# Patient Record
Sex: MALE | Race: WHITE | NOT HISPANIC OR LATINO | Employment: OTHER | URBAN - METROPOLITAN AREA
[De-identification: names, ages, dates, MRNs, and addresses within clinical notes are randomized per-mention and may not be internally consistent; named-entity substitution may affect disease eponyms.]

---

## 2017-02-02 ENCOUNTER — COMMUNICATION - HEALTHEAST (OUTPATIENT)
Dept: ADMINISTRATIVE | Facility: CLINIC | Age: 62
End: 2017-02-02

## 2017-02-02 ENCOUNTER — AMBULATORY - HEALTHEAST (OUTPATIENT)
Dept: CARDIOLOGY | Facility: CLINIC | Age: 62
End: 2017-02-02

## 2017-02-02 DIAGNOSIS — I25.10 CAD (CORONARY ARTERY DISEASE): ICD-10-CM

## 2017-02-02 DIAGNOSIS — I10 HTN (HYPERTENSION): ICD-10-CM

## 2017-02-22 ENCOUNTER — RECORDS - HEALTHEAST (OUTPATIENT)
Dept: LAB | Facility: CLINIC | Age: 62
End: 2017-02-22

## 2017-02-23 LAB — HBA1C MFR BLD: 6.3 % (ref 4.2–6.1)

## 2017-04-17 ENCOUNTER — HOSPITAL ENCOUNTER (OUTPATIENT)
Dept: NUCLEAR MEDICINE | Facility: HOSPITAL | Age: 62
Discharge: HOME | End: 2017-04-17
Attending: INTERNAL MEDICINE

## 2017-04-17 ENCOUNTER — HOSPITAL ENCOUNTER (OUTPATIENT)
Dept: CARDIOLOGY | Facility: HOSPITAL | Age: 62
Discharge: HOME | End: 2017-04-17
Attending: INTERNAL MEDICINE

## 2017-04-17 DIAGNOSIS — I25.10 CAD (CORONARY ARTERY DISEASE): ICD-10-CM

## 2017-04-17 DIAGNOSIS — I10 HTN (HYPERTENSION): ICD-10-CM

## 2017-04-17 LAB
CV STRESS CURRENT BP HE: NORMAL
CV STRESS CURRENT HR HE: 58
CV STRESS CURRENT HR HE: 64
CV STRESS CURRENT HR HE: 68
CV STRESS CURRENT HR HE: 70
CV STRESS CURRENT HR HE: 70
CV STRESS CURRENT HR HE: 71
CV STRESS CURRENT HR HE: 71
CV STRESS CURRENT HR HE: 75
CV STRESS CURRENT HR HE: 76
CV STRESS CURRENT HR HE: 76
CV STRESS CURRENT HR HE: 83
CV STRESS CURRENT HR HE: 83
CV STRESS CURRENT HR HE: 84
CV STRESS CURRENT HR HE: 85
CV STRESS CURRENT HR HE: 91
CV STRESS CURRENT HR HE: 92
CV STRESS DEVIATION TIME HE: NORMAL
CV STRESS ECHO PERCENT HR HE: NORMAL
CV STRESS EXERCISE STAGE HE: NORMAL
CV STRESS FINAL RESTING BP HE: NORMAL
CV STRESS FINAL RESTING HR HE: 71
CV STRESS MAX HR HE: 93
CV STRESS MAX TREADMILL GRADE HE: 0
CV STRESS MAX TREADMILL SPEED HE: 0
CV STRESS PEAK DIA BP HE: NORMAL
CV STRESS PEAK SYS BP HE: NORMAL
CV STRESS PHASE HE: NORMAL
CV STRESS PROTOCOL HE: NORMAL
CV STRESS RESTING PT POSITION HE: NORMAL
CV STRESS ST DEVIATION AMOUNT HE: NORMAL
CV STRESS ST DEVIATION ELEVATION HE: NORMAL
CV STRESS ST EVELATION AMOUNT HE: NORMAL
CV STRESS TEST TYPE HE: NORMAL
CV STRESS TOTAL STAGE TIME MIN 1 HE: NORMAL
NUC STRESS EJECTION FRACTION: 55 %
STRESS ECHO BASELINE BP: NORMAL
STRESS ECHO BASELINE HR: 59
STRESS ECHO CALCULATED PERCENT HR: 58 %
STRESS ECHO LAST STRESS BP: NORMAL
STRESS ECHO LAST STRESS HR: 83

## 2017-04-17 ASSESSMENT — MIFFLIN-ST. JEOR: SCORE: 1970.6

## 2017-04-24 ENCOUNTER — OFFICE VISIT - HEALTHEAST (OUTPATIENT)
Dept: CARDIOLOGY | Facility: CLINIC | Age: 62
End: 2017-04-24

## 2017-04-24 DIAGNOSIS — I25.10 CORONARY ARTERY DISEASE INVOLVING NATIVE CORONARY ARTERY OF NATIVE HEART WITHOUT ANGINA PECTORIS: ICD-10-CM

## 2017-04-24 DIAGNOSIS — I10 ESSENTIAL HYPERTENSION: ICD-10-CM

## 2017-04-24 DIAGNOSIS — E78.2 MIXED HYPERLIPIDEMIA: ICD-10-CM

## 2017-04-24 ASSESSMENT — MIFFLIN-ST. JEOR: SCORE: 1984.21

## 2017-09-11 ENCOUNTER — RECORDS - HEALTHEAST (OUTPATIENT)
Dept: LAB | Facility: CLINIC | Age: 62
End: 2017-09-11

## 2017-09-11 LAB
CHOLEST SERPL-MCNC: 139 MG/DL
FASTING STATUS PATIENT QL REPORTED: ABNORMAL
HDLC SERPL-MCNC: 34 MG/DL
LDLC SERPL CALC-MCNC: 69 MG/DL
TRIGL SERPL-MCNC: 181 MG/DL

## 2018-02-06 ENCOUNTER — COMMUNICATION - HEALTHEAST (OUTPATIENT)
Dept: ADMINISTRATIVE | Facility: CLINIC | Age: 63
End: 2018-02-06

## 2020-01-01 ENCOUNTER — TRANSFERRED RECORDS (OUTPATIENT)
Dept: MULTI SPECIALTY CLINIC | Facility: CLINIC | Age: 65
End: 2020-01-01

## 2021-05-29 ENCOUNTER — RECORDS - HEALTHEAST (OUTPATIENT)
Dept: ADMINISTRATIVE | Facility: CLINIC | Age: 66
End: 2021-05-29

## 2021-05-30 ENCOUNTER — RECORDS - HEALTHEAST (OUTPATIENT)
Dept: ADMINISTRATIVE | Facility: CLINIC | Age: 66
End: 2021-05-30

## 2021-05-30 VITALS — BODY MASS INDEX: 34.67 KG/M2 | WEIGHT: 256 LBS | HEIGHT: 72 IN

## 2021-05-30 VITALS — WEIGHT: 253 LBS | HEIGHT: 72 IN | BODY MASS INDEX: 34.27 KG/M2

## 2021-06-02 ENCOUNTER — RECORDS - HEALTHEAST (OUTPATIENT)
Dept: ADMINISTRATIVE | Facility: CLINIC | Age: 66
End: 2021-06-02

## 2021-06-09 ENCOUNTER — RECORDS - HEALTHEAST (OUTPATIENT)
Dept: ADMINISTRATIVE | Facility: CLINIC | Age: 66
End: 2021-06-09

## 2021-06-10 NOTE — PROGRESS NOTES
[facility] Progress Note    Assessment:    1.  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  Essential hypertension controlled  3.  Hyperlipidemia at target LDL cholesterol levels    Plan:    Continue patient's current medical regimen.  Would like to see Sudheer in follow-up in 1 year with no interim cardiac testing ordered    An After Visit Summary was printed and given to the patient.    Subjective:    61-year-old male who was noted to have a moderate left anterior descending artery stenosis by coronary CT angiogram in 2014.  He had a nuclear stress test that showed a small area of inferolateral ischemia at that time.  Over last year he has begun a weight loss program and has lost 30 pounds.  He is exercising without significant dyspnea or anginal symptoms    Patient had a follow-up Lexiscan Cardiolite test on April 17, 2017 that showed no areas of ischemia with ejection fraction of 55%.  Fasting lipid panel from October 2016 revealed an LDL cholesterol 54    Problem List:    There is no problem list on file for this patient.        Current Outpatient Prescriptions   Medication Sig Dispense Refill     aspirin 81 MG EC tablet Take 162 mg by mouth daily.       atorvastatin (LIPITOR) 20 MG tablet Take 20 mg by mouth daily.       JARDIANCE 10 mg Tab Take 10 mg by mouth daily.       lisinopril (PRINIVIL,ZESTRIL) 20 MG tablet Take 20 mg by mouth daily.       loratadine (CLARITIN) 10 mg tablet Take 10 mg by mouth daily.       metFORMIN (GLUCOPHAGE) 500 MG tablet Take 500 mg by mouth 2 (two) times a day.       nitroglycerin (NITROSTAT) 0.4 MG SL tablet as needed.       omeprazole (PRILOSEC) 20 MG capsule Take 20 mg by mouth 2 (two) times a day.       ULORIC 40 mg tablet Take 40 mg by mouth daily.       tadalafil (CIALIS) 20 MG tablet Take 10 mg by mouth daily. 1 hour before needed       traMADol (ULTRAM) 50 mg tablet Take 50 mg by mouth as needed.       No current facility-administered  medications for this visit.        .  Past Surgical History:   Procedure Laterality Date     ABLATION OF DYSRHYTHMIC FOCUS       CARDIAC CATHETERIZATION         .  Social History     Social History     Marital status:      Spouse name: N/A     Number of children: N/A     Years of education: N/A     Occupational History     Not on file.     Social History Main Topics     Smoking status: Former Smoker     Smokeless tobacco: Not on file     Alcohol use Not on file     Drug use: Not on file     Sexual activity: Not on file     Other Topics Concern     Not on file     Social History Narrative       .  Family History   Problem Relation Age of Onset     Heart attack Father      Review of Systems:  General: Weight Loss  Eyes: WNL  Ears/Nose/Throat: WNL  Lungs: WNL  Heart: WNL  Stomach: WNL  Bladder: Frequent Urination at Night  Muscle/Joints: WNL  Skin: WNL  Nervous System: Daytime Sleepiness  Mental Health: WNL     Blood: WNL    Objective:     /66 (Patient Site: Right Arm, Patient Position: Sitting, Cuff Size: Adult Large)  Pulse 70  Resp 18  Ht 6' (1.829 m)  Wt (!) 256 lb (116.1 kg)  SpO2 95%  BMI 34.72 kg/m2  (!) 256 lb (116.1 kg)   [unfilled]    Physical Exam:    GENERAL APPEARANCE: alert, no apparent distress  HEENT: no scleral icterus or xanthelasma  NECK: jugular venous pressure normal  CHEST: symmetric, the lungs were clear to auscultation  CARDIOVASCULAR: regular rhythm without murmur, click, or gallop; no carotid bruits  ABDOMEN: nondistended, nontender, bowel sounds present  EXTREMITIES: no cyanosis, clubbing or edema.    Cardiac Testing:    Cardiolite (Tc-99m Sestamibi) stress test from April 17, 2017 results reviewed as above      Lab Results:    LIPIDS:  Lab Results   Component Value Date    CHOL 111 10/17/2016    CHOL 168 03/14/2016    CHOL 165 08/17/2015     Lab Results   Component Value Date    HDL 26 (L) 10/17/2016    HDL 33 (L) 03/14/2016    HDL 30 (L) 08/17/2015     Lab Results    Component Value Date    LDLCALC 54 10/17/2016    LDLCALC 76 03/14/2016    LDLCALC  08/17/2015      Comment:      Invalid, Triglycerides >300     Lab Results   Component Value Date    TRIG 157 (H) 10/17/2016    TRIG 293 (H) 03/14/2016    TRIG 349 (H) 08/17/2015     No components found for: CHOLHDL    BMP:  Lab Results   Component Value Date    CREATININE 0.87 10/17/2016    BUN 16 10/17/2016     10/17/2016    K 4.6 10/17/2016     10/17/2016    CO2 24 10/17/2016         Chidi Wood MD,F.A.C.C.  [facility] hospital  [phone]

## 2021-07-13 ENCOUNTER — RECORDS - HEALTHEAST (OUTPATIENT)
Dept: ADMINISTRATIVE | Facility: CLINIC | Age: 66
End: 2021-07-13

## 2024-09-13 ENCOUNTER — TRANSFERRED RECORDS (OUTPATIENT)
Dept: HEALTH INFORMATION MANAGEMENT | Facility: CLINIC | Age: 69
End: 2024-09-13

## 2024-09-13 LAB — RETINOPATHY: NEGATIVE

## 2024-10-01 ENCOUNTER — TRANSFERRED RECORDS (OUTPATIENT)
Dept: MULTI SPECIALTY CLINIC | Facility: CLINIC | Age: 69
End: 2024-10-01

## 2024-10-01 LAB — RETINOPATHY: NORMAL

## 2024-11-15 ENCOUNTER — OFFICE VISIT (OUTPATIENT)
Dept: FAMILY MEDICINE | Facility: CLINIC | Age: 69
End: 2024-11-15
Payer: COMMERCIAL

## 2024-11-15 VITALS
RESPIRATION RATE: 18 BRPM | DIASTOLIC BLOOD PRESSURE: 68 MMHG | WEIGHT: 271.1 LBS | HEART RATE: 93 BPM | HEIGHT: 70 IN | BODY MASS INDEX: 38.81 KG/M2 | OXYGEN SATURATION: 97 % | SYSTOLIC BLOOD PRESSURE: 134 MMHG | TEMPERATURE: 97 F

## 2024-11-15 DIAGNOSIS — E11.69 DISORDER OF NERVOUS SYSTEM DUE TO TYPE 2 DIABETES MELLITUS (H): ICD-10-CM

## 2024-11-15 DIAGNOSIS — Z93.6 STATUS POST ILEAL CONDUIT (H): ICD-10-CM

## 2024-11-15 DIAGNOSIS — E66.01 CLASS 2 SEVERE OBESITY DUE TO EXCESS CALORIES WITH SERIOUS COMORBIDITY AND BODY MASS INDEX (BMI) OF 38.0 TO 38.9 IN ADULT (H): ICD-10-CM

## 2024-11-15 DIAGNOSIS — E78.2 MIXED HYPERLIPIDEMIA: ICD-10-CM

## 2024-11-15 DIAGNOSIS — C49.9 LEIOMYOSARCOMA (H): ICD-10-CM

## 2024-11-15 DIAGNOSIS — G98.8 DISORDER OF NERVOUS SYSTEM DUE TO TYPE 2 DIABETES MELLITUS (H): ICD-10-CM

## 2024-11-15 DIAGNOSIS — K21.9 GASTROESOPHAGEAL REFLUX DISEASE WITHOUT ESOPHAGITIS: ICD-10-CM

## 2024-11-15 DIAGNOSIS — J30.2 SEASONAL ALLERGIC RHINITIS, UNSPECIFIED TRIGGER: ICD-10-CM

## 2024-11-15 DIAGNOSIS — E66.812 CLASS 2 SEVERE OBESITY DUE TO EXCESS CALORIES WITH SERIOUS COMORBIDITY AND BODY MASS INDEX (BMI) OF 38.0 TO 38.9 IN ADULT (H): ICD-10-CM

## 2024-11-15 DIAGNOSIS — K75.81 NONALCOHOLIC STEATOHEPATITIS (NASH): ICD-10-CM

## 2024-11-15 DIAGNOSIS — M1A.09X0 CHRONIC GOUT OF MULTIPLE SITES, UNSPECIFIED CAUSE: ICD-10-CM

## 2024-11-15 DIAGNOSIS — I10 PRIMARY HYPERTENSION: ICD-10-CM

## 2024-11-15 DIAGNOSIS — C61 PRIMARY MALIGNANT NEOPLASM OF PROSTATE (H): ICD-10-CM

## 2024-11-15 DIAGNOSIS — I25.10 ARTERIOSCLEROSIS OF CORONARY ARTERY: ICD-10-CM

## 2024-11-15 DIAGNOSIS — C67.9 MALIGNANT NEOPLASM OF URINARY BLADDER, UNSPECIFIED SITE (H): ICD-10-CM

## 2024-11-15 DIAGNOSIS — G47.33 OSA (OBSTRUCTIVE SLEEP APNEA): ICD-10-CM

## 2024-11-15 DIAGNOSIS — E11.8 TYPE 2 DIABETES MELLITUS WITH COMPLICATION, WITHOUT LONG-TERM CURRENT USE OF INSULIN (H): Primary | ICD-10-CM

## 2024-11-15 PROBLEM — Z87.891 PERSONAL HISTORY OF TOBACCO USE, PRESENTING HAZARDS TO HEALTH: Status: ACTIVE | Noted: 2024-02-08

## 2024-11-15 PROBLEM — Z85.831 PERSONAL HISTORY OF MALIGNANT NEOPLASM OF SOFT TISSUE: Status: ACTIVE | Noted: 2024-04-12

## 2024-11-15 PROBLEM — L85.1 PLANTAR POROKERATOSIS, ACQUIRED: Status: ACTIVE | Noted: 2021-05-19

## 2024-11-15 PROBLEM — E78.5 HYPERLIPIDEMIA: Status: ACTIVE | Noted: 2017-12-14

## 2024-11-15 PROBLEM — N40.0 BENIGN PROSTATIC HYPERPLASIA: Status: ACTIVE | Noted: 2023-12-26

## 2024-11-15 PROCEDURE — 99204 OFFICE O/P NEW MOD 45 MIN: CPT | Performed by: FAMILY MEDICINE

## 2024-11-15 RX ORDER — ASPIRIN 81 MG/1
81 TABLET, CHEWABLE ORAL DAILY
Qty: 90 TABLET | Refills: 1 | Status: SHIPPED | OUTPATIENT
Start: 2024-11-15

## 2024-11-15 RX ORDER — QUINIDINE GLUCONATE 324 MG
27 TABLET, EXTENDED RELEASE ORAL
COMMUNITY

## 2024-11-15 RX ORDER — LISINOPRIL 20 MG/1
20 TABLET ORAL DAILY
Qty: 90 TABLET | Refills: 3 | Status: SHIPPED | OUTPATIENT
Start: 2024-11-15

## 2024-11-15 RX ORDER — INSULIN ASPART 100 [IU]/ML
1-5 INJECTION, SOLUTION INTRAVENOUS; SUBCUTANEOUS
COMMUNITY
Start: 2024-02-01

## 2024-11-15 RX ORDER — INSULIN DEGLUDEC 100 U/ML
35 INJECTION, SOLUTION SUBCUTANEOUS EVERY MORNING
Qty: 15 ML | Refills: 3 | Status: SHIPPED | OUTPATIENT
Start: 2024-11-15

## 2024-11-15 RX ORDER — LORATADINE 10 MG/1
10 TABLET ORAL DAILY
Qty: 90 TABLET | Refills: 3 | Status: SHIPPED | OUTPATIENT
Start: 2024-11-15

## 2024-11-15 RX ORDER — INSULIN DEGLUDEC 100 U/ML
35 INJECTION, SOLUTION SUBCUTANEOUS
COMMUNITY
Start: 2024-06-28 | End: 2024-11-15

## 2024-11-15 RX ORDER — ATORVASTATIN CALCIUM 20 MG/1
20 TABLET, FILM COATED ORAL DAILY
Qty: 90 TABLET | Refills: 3 | Status: SHIPPED | OUTPATIENT
Start: 2024-11-15

## 2024-11-15 NOTE — PROGRESS NOTES
Assessment & Plan     (E11.8) Type 2 diabetes mellitus with complication, without long-term current use of insulin (H)  (primary encounter diagnosis)  Comment: Last A1c ( 6/2024 ) was 6.9. will continue current plan of care and check A1c. He prefers to come back for labs in fasting state. All meds refilled   Plan: Lipid panel reflex to direct LDL Non-fasting,         Glucose, insulin degludec (TRESIBA FLEXTOUCH)         100 UNIT/ML pen, Hemoglobin A1c, Comprehensive         metabolic panel (BMP + Alb, Alk Phos, ALT, AST,        Total. Bili, TP), Lipid panel reflex to direct         LDL Fasting, Albumin Random Urine Quantitative         with Creat Ratio            (M1A.09X0) Chronic gout of multiple sites, unspecified cause  Comment: discussed and uric acid level ordered. On uloric. 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 JERRY sent for more info   Plan: aspirin (ASA) 81 MG chewable tablet,         Comprehensive metabolic panel (BMP + Alb, Alk         Phos, ALT, AST, Total. Bili, TP)            (K75.81) Nonalcoholic steatohepatitis (SUGGS)  Comment:  will check liver functions today. He did see GI to establish diagnosis. Working on diet/exercise   Plan:     (E78.2) Mixed hyperlipidemia  Comment: discussed and ordered labs.   Plan: Lipid panel reflex to direct LDL  atorvastatin (LIPITOR) 20 MG tablet,                (I25.10) Arteriosclerosis of coronary artery  Comment: says he was told there is some narrowing but no cardiac procedures   Plan:     (I10) Primary  "hypertension  Comment: on lisinopril   BP Readings from Last 6 Encounters:   11/15/24 134/68       Plan: Lipid panel reflex to direct LDL Non-fasting,         lisinopril (ZESTRIL) 20 MG tablet, aspirin         (ASA) 81 MG chewable tablet, Hemoglobin A1c,         Comprehensive metabolic panel (BMP + Alb, Alk         Phos, ALT, AST, Total. Bili, TP), Lipid panel         reflex to direct LDL Fasting, Albumin Random         Urine Quantitative with Creat Ratio, Uric acid            (C67.9) Malignant neoplasm of urinary bladder, unspecified site (H)  Comment: has stoma now. Will follow with oncology   Plan: Comprehensive metabolic panel (BMP + Alb, Alk         Phos, ALT, AST, Total. 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 Had surgery this past spring   Plan:     (Z93.6) Status post ileal conduit (H)  Comment: doing well. Says he feels comfortable managing the stoma   Plan:       BMI  Estimated body mass index is 38.59 kg/m  as calculated from the following:    Height as of this encounter: 1.785 m (5' 10.28\").    Weight as of this encounter: 123 kg (271 lb 1.6 oz).   Weight management plan: Discussed healthy diet and exercise guidelines    Work on weight loss  Regular exercise    Jackson Tyler is a 69 year old, presenting for the following health issues:  [facility]        11/15/2024     1:19 PM   Additional Questions   Roomed by CHICA Teague   Accompanied by wife- matt     Via the Health Maintenance questionnaire, the patient has reported the following services have been completed -Colonscopy: [facility] " 2020-01-01, this information has been sent to the abstraction team.      History of Present Illness       Diabetes:   He presents for follow up of diabetes.   He is checking home blood glucose with a continuous glucose monitor.   He checks blood glucose before meals and at bedtime.  Blood glucose is sometimes over 200 and never under 70.  When his blood glucose is low, the patient is asymptomatic for confusion, blurred vision, lethargy and reports not feeling dizzy, shaky, or weak.  He is concerned about blood sugar frequently over 200.   He is having numbness in feet, burning in feet and excessive thirst.            Hyperlipidemia:  He presents for follow up of hyperlipidemia.   He is taking medication to lower cholesterol. He is not having myalgia or other side effects to statin medications.    Hypertension: He presents for follow up of hypertension.  He does not check blood pressure  regularly outside of the clinic. Outpatient blood pressures have not been over 140/90. He does not follow a low salt diet.     He eats 2-3 servings of fruits and vegetables daily.He consumes 0 sweetened beverage(s) daily.He exercises with enough effort to increase his heart rate 9 or less minutes per day.  He exercises with enough effort to increase his heart rate 3 or less days per week.   He is taking medications regularly.     Here with his wife  New patient to me     Diabetes - on tresiba and novolog now  Not taking the metformin because of GI side effects   Last A1c ( 6/2024 ) was 6.9     Vandana 3 :  Lab Results   Component Value Date    A1C 6.3 02/22/2017    A1C 7.7 07/11/2016    A1C 7.4 03/27/2014       Surgery for the leiomyosarcoma - 3month scans for 3 years, then every 6 months, then annually   Removed prostate and bladder - has a stoma now - he says it is going really well     On iron orally due to     CAD - WPW ablation   Some calcification  - no stent or bypass     ES - saw GI.     Has gout that is under good control  "per his report      Review of Systems  Constitutional, neuro, ENT, endocrine, pulmonary, cardiac, gastrointestinal, genitourinary, musculoskeletal, integument and psychiatric systems are negative, except as otherwise noted.      Objective    /68   Pulse 93   Temp 97  F (36.1  C) (Tympanic)   Resp 18   Ht 1.785 m (5' 10.28\")   Wt 123 kg (271 lb 1.6 oz)   SpO2 97%   BMI 38.59 kg/m    Body mass index is 38.59 kg/m .  Physical Exam   GENERAL: alert and no distress  EYES: Eyes grossly normal to inspection, PERRL and conjunctivae and sclerae normal  NECK: no adenopathy, no asymmetry, masses, or scars  RESP: lungs clear to auscultation - no rales, rhonchi or wheezes  CV: regular rate and rhythm, normal S1 S2, no S3 or S4, no murmur, click or rub, no peripheral edema  ABDOMEN: soft, nontender, without hepatosplenomegaly or masses, bowel sounds normal, and stoma well healed   MS: no gross musculoskeletal defects noted, no edema  SKIN: no suspicious lesions or rashes  NEURO: Normal strength and tone, mentation intact and speech normal  PSYCH: mentation appears normal, affect normal/bright          Signed Electronically by: Daisha Orlando MD    "

## 2024-11-17 PROBLEM — Z85.831 PERSONAL HISTORY OF MALIGNANT NEOPLASM OF SOFT TISSUE: Status: RESOLVED | Noted: 2024-04-12 | Resolved: 2024-11-17

## 2024-12-04 ENCOUNTER — LAB (OUTPATIENT)
Dept: LAB | Facility: CLINIC | Age: 69
End: 2024-12-04
Payer: COMMERCIAL

## 2024-12-04 DIAGNOSIS — C67.9 MALIGNANT NEOPLASM OF URINARY BLADDER, UNSPECIFIED SITE (H): ICD-10-CM

## 2024-12-04 DIAGNOSIS — M1A.09X0 CHRONIC GOUT OF MULTIPLE SITES, UNSPECIFIED CAUSE: ICD-10-CM

## 2024-12-04 DIAGNOSIS — E11.8 TYPE 2 DIABETES MELLITUS WITH COMPLICATION, WITHOUT LONG-TERM CURRENT USE OF INSULIN (H): ICD-10-CM

## 2024-12-04 DIAGNOSIS — I10 PRIMARY HYPERTENSION: ICD-10-CM

## 2024-12-04 DIAGNOSIS — E78.2 MIXED HYPERLIPIDEMIA: ICD-10-CM

## 2024-12-04 LAB
ALBUMIN SERPL BCG-MCNC: 4.4 G/DL (ref 3.5–5.2)
ALP SERPL-CCNC: 120 U/L (ref 40–150)
ALT SERPL W P-5'-P-CCNC: 27 U/L (ref 0–70)
ANION GAP SERPL CALCULATED.3IONS-SCNC: 11 MMOL/L (ref 7–15)
AST SERPL W P-5'-P-CCNC: 22 U/L (ref 0–45)
BILIRUB SERPL-MCNC: 0.4 MG/DL
BUN SERPL-MCNC: 38.9 MG/DL (ref 8–23)
CALCIUM SERPL-MCNC: 10 MG/DL (ref 8.8–10.4)
CHLORIDE SERPL-SCNC: 99 MMOL/L (ref 98–107)
CHOLEST SERPL-MCNC: 174 MG/DL
CREAT SERPL-MCNC: 1.63 MG/DL (ref 0.67–1.17)
CREAT UR-MCNC: 103.9 MG/DL
EGFRCR SERPLBLD CKD-EPI 2021: 45 ML/MIN/1.73M2
EST. AVERAGE GLUCOSE BLD GHB EST-MCNC: 220 MG/DL
FASTING STATUS PATIENT QL REPORTED: NO
GLUCOSE SERPL-MCNC: 271 MG/DL (ref 70–99)
GLUCOSE SERPL-MCNC: 271 MG/DL (ref 70–99)
HBA1C MFR BLD: 9.3 % (ref 0–5.6)
HCO3 SERPL-SCNC: 21 MMOL/L (ref 22–29)
HDLC SERPL-MCNC: 32 MG/DL
LDLC SERPL CALC-MCNC: 91 MG/DL
MICROALBUMIN UR-MCNC: 657.5 MG/L
MICROALBUMIN/CREAT UR: 632.82 MG/G CR (ref 0–17)
NONHDLC SERPL-MCNC: 142 MG/DL
POTASSIUM SERPL-SCNC: 4.8 MMOL/L (ref 3.4–5.3)
PROT SERPL-MCNC: 8.1 G/DL (ref 6.4–8.3)
SODIUM SERPL-SCNC: 131 MMOL/L (ref 135–145)
TRIGL SERPL-MCNC: 255 MG/DL
URATE SERPL-MCNC: 5.4 MG/DL (ref 3.4–7)

## 2024-12-04 PROCEDURE — 80053 COMPREHEN METABOLIC PANEL: CPT

## 2024-12-04 PROCEDURE — 84550 ASSAY OF BLOOD/URIC ACID: CPT

## 2024-12-04 PROCEDURE — 82570 ASSAY OF URINE CREATININE: CPT

## 2024-12-04 PROCEDURE — 80061 LIPID PANEL: CPT

## 2024-12-04 PROCEDURE — 36415 COLL VENOUS BLD VENIPUNCTURE: CPT

## 2024-12-04 PROCEDURE — 82043 UR ALBUMIN QUANTITATIVE: CPT

## 2024-12-04 PROCEDURE — 83036 HEMOGLOBIN GLYCOSYLATED A1C: CPT

## 2024-12-07 ENCOUNTER — HEALTH MAINTENANCE LETTER (OUTPATIENT)
Age: 69
End: 2024-12-07

## 2024-12-10 ENCOUNTER — OFFICE VISIT (OUTPATIENT)
Dept: FAMILY MEDICINE | Facility: CLINIC | Age: 69
End: 2024-12-10
Payer: COMMERCIAL

## 2024-12-10 VITALS
SYSTOLIC BLOOD PRESSURE: 118 MMHG | OXYGEN SATURATION: 98 % | BODY MASS INDEX: 38.07 KG/M2 | TEMPERATURE: 97.4 F | RESPIRATION RATE: 20 BRPM | DIASTOLIC BLOOD PRESSURE: 64 MMHG | WEIGHT: 267.4 LBS | HEART RATE: 86 BPM

## 2024-12-10 DIAGNOSIS — E11.40 TYPE 2 DIABETES MELLITUS WITH DIABETIC NEUROPATHY, WITH LONG-TERM CURRENT USE OF INSULIN (H): Primary | ICD-10-CM

## 2024-12-10 DIAGNOSIS — N18.31 STAGE 3A CHRONIC KIDNEY DISEASE (H): ICD-10-CM

## 2024-12-10 DIAGNOSIS — E66.812 CLASS 2 SEVERE OBESITY DUE TO EXCESS CALORIES WITH SERIOUS COMORBIDITY AND BODY MASS INDEX (BMI) OF 38.0 TO 38.9 IN ADULT (H): ICD-10-CM

## 2024-12-10 DIAGNOSIS — G47.33 OSA (OBSTRUCTIVE SLEEP APNEA): ICD-10-CM

## 2024-12-10 DIAGNOSIS — Z93.6 STATUS POST ILEAL CONDUIT (H): ICD-10-CM

## 2024-12-10 DIAGNOSIS — E66.01 CLASS 2 SEVERE OBESITY DUE TO EXCESS CALORIES WITH SERIOUS COMORBIDITY AND BODY MASS INDEX (BMI) OF 38.0 TO 38.9 IN ADULT (H): ICD-10-CM

## 2024-12-10 DIAGNOSIS — Z79.4 TYPE 2 DIABETES MELLITUS WITH DIABETIC NEUROPATHY, WITH LONG-TERM CURRENT USE OF INSULIN (H): Primary | ICD-10-CM

## 2024-12-10 DIAGNOSIS — E78.2 MIXED HYPERLIPIDEMIA: ICD-10-CM

## 2024-12-10 DIAGNOSIS — C49.9 LEIOMYOSARCOMA (H): ICD-10-CM

## 2024-12-10 PROBLEM — Z87.891 PERSONAL HISTORY OF TOBACCO USE, PRESENTING HAZARDS TO HEALTH: Status: RESOLVED | Noted: 2024-02-08 | Resolved: 2024-12-10

## 2024-12-10 PROCEDURE — 99214 OFFICE O/P EST MOD 30 MIN: CPT | Performed by: FAMILY MEDICINE

## 2024-12-10 PROCEDURE — G2211 COMPLEX E/M VISIT ADD ON: HCPCS | Performed by: FAMILY MEDICINE

## 2024-12-10 NOTE — PROGRESS NOTES
Assessment & Plan     (E11.40,  Z79.4) Type 2 diabetes mellitus with diabetic neuropathy, with long-term current use of insulin (H)  (primary encounter diagnosis)  Comment: continue novolog with meals. Continue long acting tresiba 35U/day. Discussed meds and decided to start GLP1 for diabetic control and weight management. Discussed risks/benefits/side effects with the patient. Starting with 0.25 dose - send me a message in a month   Plan:     (N18.31) Stage 3a chronic kidney disease (H)  Comment: worsening GFR over last 6 months. New onset proteinuria. Likely related to worsened diabetic control.  Will recheck labs in a few weeks and working to get diabetes better managed.  Will refer to renal if labs worsen. The patient indicates understanding of these issues and agrees with the plan.   Plan: Basic metabolic panel  (Ca, Cl, CO2, Creat,         Gluc, K, Na, BUN), US Renal Limited, Albumin         Random Urine Quantitative with Creat Ratio            (E66.812,  E66.01,  Z68.38) Class 2 severe obesity due to excess calories with serious comorbidity and body mass index (BMI) of 38.0 to 38.9 in adult (H)  Comment: discussed importance of weight loss for diabetic control.  Starting ozempic today   Plan:     (E78.2) Mixed hyperlipidemia  Comment: on statin   Plan:     (Z93.6) Status post ileal conduit (H)  Comment: doing well with the stoma and bag.   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 Will make sure kidney look ok as well.   Plan:       Jackson Tyler is a 69 year old, presenting for the following health issues:  Diabetes and Results        12/10/2024     8:56 AM   Additional Questions   Roomed by CHICA Teague   Accompanied by Wife      Via the Health Maintenance questionnaire, the patient has reported the following services have been completed -Eye Exam: [facility] eye clinic 2024-10-01, this information has been sent to the abstraction team.  Had pt sign JERRY signed  for records.       History of Present Illness       Diabetes:   He presents for follow up of diabetes.   He is checking home blood glucose with a continuous glucose monitor.   He checks blood glucose before meals, after meals, before and after meals and at bedtime.  Blood glucose is sometimes over 200 and never under 70.  When his blood glucose is low, the patient is asymptomatic for confusion, blurred vision, lethargy and reports not feeling dizzy, shaky, or weak.  He is concerned about blood sugar frequently over 200.   He is having numbness in feet, burning in feet and weight gain.  The patient has had a diabetic eye exam in the last 12 months. Eye exam performed on 10/01/2024. Location of last eye exam [facility] eye [facility].       New patient on 11/15/24 to me  My note is reviewed     He returns because the A1c is elevated.     Re; diabetes - is on tresiba and novolog  Had stopped taking the metformin and jardiance : 1/2024   Trulicity 1.5mg weekly - in the past - was stopped at the start of 2024       A1c 6.9 6/2024     Lab Results   Component Value Date    A1C 9.3 12/04/2024    A1C 6.3 02/22/2017    A1C 7.7 07/11/2016    A1C 7.4 03/27/2014     Taking 35 units of tresiba   Novolog 10 with each meal for about a month     Sleep test from [facility] received- done 2019     Follow up on recent labs.    Concern for his urine results--- this was his first urine test since having surgery for stoma.     Current has cold symptoms.     Elevated creatinine - worsening   GFR reviewed in chart -  data so won't pull in   12/4/24 45  9/23/24 55  6/2024 58  4/2024 83    No nsaids  Not well hydrated     CT abd/pelvis 9/2024 :   Decreased bilateral inflammatory ureteral wall thickening and periureteral inflammatory changes. Mild bilateral pyelocaliectasis and left ureterectasis are not significantly changed. Tiny low-attenuation lesion in the lateral periphery of the left mid    kidney is slightly less apparent  and is likely inflammatory in nature.       Review of Systems  Constitutional, HEENT, cardiovascular, pulmonary, gi and gu systems are negative, except as otherwise noted.      Objective    /64   Pulse 86   Temp 97.4  F (36.3  C) (Tympanic)   Resp 20   Wt 121.3 kg (267 lb 6.4 oz)   SpO2 98%   BMI 38.07 kg/m    Body mass index is 38.07 kg/m .  Physical Exam   Pt is alert and oriented in no acute distress.  Affect is appropriate. Good eye contact.          Signed Electronically by: Daisha Orlando MD

## 2024-12-20 ENCOUNTER — LAB (OUTPATIENT)
Dept: LAB | Facility: CLINIC | Age: 69
End: 2024-12-20
Payer: COMMERCIAL

## 2024-12-20 DIAGNOSIS — N18.31 STAGE 3A CHRONIC KIDNEY DISEASE (H): Primary | ICD-10-CM

## 2024-12-30 DIAGNOSIS — E11.8 TYPE 2 DIABETES MELLITUS WITH UNSPECIFIED COMPLICATIONS (H): ICD-10-CM

## 2024-12-30 RX ORDER — SEMAGLUTIDE 0.68 MG/ML
0.5 INJECTION, SOLUTION SUBCUTANEOUS
Qty: 3 ML | Refills: 0 | Status: SHIPPED | OUTPATIENT
Start: 2024-12-30

## 2025-01-21 ENCOUNTER — OFFICE VISIT (OUTPATIENT)
Dept: FAMILY MEDICINE | Facility: CLINIC | Age: 70
End: 2025-01-21
Payer: COMMERCIAL

## 2025-01-21 ENCOUNTER — APPOINTMENT (OUTPATIENT)
Dept: FAMILY MEDICINE | Facility: CLINIC | Age: 70
End: 2025-01-21
Payer: COMMERCIAL

## 2025-01-21 ENCOUNTER — TELEPHONE (OUTPATIENT)
Dept: FAMILY MEDICINE | Facility: CLINIC | Age: 70
End: 2025-01-21

## 2025-01-21 VITALS
HEIGHT: 70 IN | DIASTOLIC BLOOD PRESSURE: 68 MMHG | BODY MASS INDEX: 37.99 KG/M2 | TEMPERATURE: 96.9 F | OXYGEN SATURATION: 94 % | RESPIRATION RATE: 18 BRPM | HEART RATE: 74 BPM | SYSTOLIC BLOOD PRESSURE: 120 MMHG | WEIGHT: 265.4 LBS

## 2025-01-21 DIAGNOSIS — E11.8 TYPE 2 DIABETES MELLITUS WITH COMPLICATION, WITHOUT LONG-TERM CURRENT USE OF INSULIN (H): Primary | ICD-10-CM

## 2025-01-21 DIAGNOSIS — N18.31 STAGE 3A CHRONIC KIDNEY DISEASE (H): ICD-10-CM

## 2025-01-21 DIAGNOSIS — Z93.6 STATUS POST ILEAL CONDUIT (H): ICD-10-CM

## 2025-01-21 DIAGNOSIS — C49.9 LEIOMYOSARCOMA (H): ICD-10-CM

## 2025-01-21 DIAGNOSIS — E66.812 CLASS 2 SEVERE OBESITY DUE TO EXCESS CALORIES WITH SERIOUS COMORBIDITY AND BODY MASS INDEX (BMI) OF 38.0 TO 38.9 IN ADULT (H): ICD-10-CM

## 2025-01-21 DIAGNOSIS — E11.8 TYPE 2 DIABETES MELLITUS WITH COMPLICATION, WITHOUT LONG-TERM CURRENT USE OF INSULIN (H): ICD-10-CM

## 2025-01-21 DIAGNOSIS — E66.01 CLASS 2 SEVERE OBESITY DUE TO EXCESS CALORIES WITH SERIOUS COMORBIDITY AND BODY MASS INDEX (BMI) OF 38.0 TO 38.9 IN ADULT (H): ICD-10-CM

## 2025-01-21 PROBLEM — E11.40 TYPE 2 DIABETES MELLITUS WITH DIABETIC NEUROPATHY, WITH LONG-TERM CURRENT USE OF INSULIN (H): Status: RESOLVED | Noted: 2017-12-14 | Resolved: 2025-01-21

## 2025-01-21 PROBLEM — Z79.4 TYPE 2 DIABETES MELLITUS WITH DIABETIC NEUROPATHY, WITH LONG-TERM CURRENT USE OF INSULIN (H): Status: RESOLVED | Noted: 2017-12-14 | Resolved: 2025-01-21

## 2025-01-21 PROCEDURE — 99214 OFFICE O/P EST MOD 30 MIN: CPT | Performed by: FAMILY MEDICINE

## 2025-01-21 PROCEDURE — 99207 PR FOOT EXAM NO CHARGE: CPT | Performed by: FAMILY MEDICINE

## 2025-01-21 PROCEDURE — G0009 ADMIN PNEUMOCOCCAL VACCINE: HCPCS | Performed by: FAMILY MEDICINE

## 2025-01-21 PROCEDURE — 90677 PCV20 VACCINE IM: CPT | Performed by: FAMILY MEDICINE

## 2025-01-21 PROCEDURE — G2211 COMPLEX E/M VISIT ADD ON: HCPCS | Performed by: FAMILY MEDICINE

## 2025-01-21 RX ORDER — HYDROCHLOROTHIAZIDE 12.5 MG/1
CAPSULE ORAL
Qty: 1 EACH | Refills: 0 | Status: SHIPPED | OUTPATIENT
Start: 2025-01-21

## 2025-01-21 RX ORDER — ACYCLOVIR 800 MG/1
TABLET ORAL
Qty: 1 EACH | Refills: 0 | Status: SHIPPED | OUTPATIENT
Start: 2025-01-21

## 2025-01-21 RX ORDER — ACYCLOVIR 800 MG/1
TABLET ORAL
Qty: 6 EACH | Refills: 1 | Status: SHIPPED | OUTPATIENT
Start: 2025-01-21

## 2025-01-21 RX ORDER — LANCETS
EACH MISCELLANEOUS
Qty: 100 EACH | Refills: 6 | Status: SHIPPED | OUTPATIENT
Start: 2025-01-21

## 2025-01-21 RX ORDER — HYDROCHLOROTHIAZIDE 12.5 MG/1
CAPSULE ORAL
Qty: 2 EACH | Refills: 0 | Status: CANCELLED | OUTPATIENT
Start: 2025-01-21

## 2025-01-21 ASSESSMENT — PAIN SCALES - GENERAL: PAINLEVEL_OUTOF10: NO PAIN (0)

## 2025-01-21 NOTE — TELEPHONE ENCOUNTER
Hello,    Thank you for sending the Measy 3 Plus Sensors. [facility] is saying they will not cover only #1 sensor. Would you be able to send in a new Rx for quantity #2 sensors for 30 day supply?    Thank you,    Jenn Feldman, [facility]  [phone]

## 2025-01-21 NOTE — TELEPHONE ENCOUNTER
Dr. Orlando:    Please see message below, have the script cued up for 2 Vandana 3 plus sensors, please advise.      VAZQUEZ Del Rio

## 2025-01-21 NOTE — TELEPHONE ENCOUNTER
Dr. Orlando:    See message below, [facility] will [facility] to [facility], but question is, the regular sensor [facility], not the Vandana 3 plus, do we need to resend as sensor plus or wait for patient to notify?      VAZQEUZ Del Rio

## 2025-01-21 NOTE — TELEPHONE ENCOUNTER
I think we should see what they have in stock. Our pharmacy was out of the akilah 3 but [facility] might have it in stock.   LEONARD Orlando MD

## 2025-01-21 NOTE — TELEPHONE ENCOUNTER
Patient came to the clinic, he was short with the staff, would not wait to see the RN, he says he needs Vandana sensor 3 plus and not the regular one, also called pharmacy and spoke to Isidro, he says yes is what is needed. Do see that this was already sent today, called pharmacy and they do see the correct sensor, will close this encounter.      VAZQUEZ Del Rio

## 2025-01-21 NOTE — TELEPHONE ENCOUNTER
I think we can disregard this message. Patient now came in and is going to just get it at [facility]. Looks like Dr. Orlando sent an order there as well.

## 2025-01-21 NOTE — PROGRESS NOTES
Assessment & Plan     (E11.8) Type 2 diabetes mellitus with complication, without long-term current use of insulin (H)  (primary encounter diagnosis)  Comment: will switch tresiba 35U to the morning and send me a message in a couple weeks letting me know if he goes low.  Increase the ozempic to 1.0mg. CGM sensors refilled and back up glucose monitor sent   Plan: FOOT EXAM, Continuous Glucose Sensor (FREESTYLE        NAT 3 SENSOR) MISC, blood glucose monitoring         (NO BRAND SPECIFIED) meter device kit, blood         glucose (NO BRAND SPECIFIED) test strip, thin         (NO BRAND SPECIFIED) lancets, Continuous         Glucose Sensor (FREESTYLE NAT 3 SENSOR) MISC,        Semaglutide, 1 MG/DOSE, (OZEMPIC) 4 MG/3ML pen,        Hemoglobin A1c            (N18.31) Stage 3a chronic kidney disease (H)  Comment: stable   Plan:     (Z93.6) Status post ileal conduit (H)  Comment: doing ok.   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   He is checking home blood glucose with a continuous glucose monitor.   He checks blood glucose before and after meals and at bedtime.  Blood glucose is sometimes over 200 and sometimes under 70. He is aware of hypoglycemia symptoms including shakiness and weakness.   He is concerned about other.   He is having numbness in feet, burning in feet and excessive thirst.            He eats 2-3 servings of fruits and vegetables daily.He consumes 0 sweetened beverage(s) daily.He exercises  "with enough effort to increase his heart rate 9 or less minutes per day.  He exercises with enough effort to increase his heart rate 3 or less days per week.   He is taking medications regularly.     Plan at our visit 12/10/24 :   (E11.40,  Z79.4) Type 2 diabetes mellitus with diabetic neuropathy, with long-term current use of insulin (H)  (primary encounter diagnosis)  Comment: continue novolog with meals. Continue long acting tresiba 35U/day. Discussed meds and decided to start GLP1 for diabetic control and weight management. Discussed risks/benefits/side effects with the patient. Starting with 0.25 dose - send me a message in a month     (N18.31) Stage 3a chronic kidney disease (H)  Comment: worsening GFR over last 6 months. New onset proteinuria. Likely related to worsened diabetic control.  Will recheck labs in a few weeks and working to get diabetes better managed.  Will refer to renal if labs worsen. The patient indicates understanding of these issues and agrees with the plan.       Was started on the ozempic last month - has tapered up to the 0.5 mg   Had one bout of vomiting   Minimal nausea     Wt Readings from Last 4 Encounters:   01/21/25 120.4 kg (265 lb 6.4 oz)   12/10/24 121.3 kg (267 lb 6.4 oz)   11/15/24 123 kg (271 lb 1.6 oz)   04/24/17 116.1 kg (256 lb)     Says BG drops quickly  - has had a few episodes into the 50s     Doing novolog 10 with dinner   Tresiba 35 U at night     Lab Results   Component Value Date    A1C 9.3 12/04/2024    A1C 6.3 02/22/2017    A1C 7.7 07/11/2016    A1C 7.4 03/27/2014         Review of Systems  Constitutional, HEENT, cardiovascular, pulmonary, gi and gu systems are negative, except as otherwise noted.      Objective    /68 (BP Location: Right arm, Patient Position: Sitting, Cuff Size: Adult Regular)   Pulse 74   Temp 96.9  F (36.1  C) (Tympanic)   Resp 18   Ht 1.785 m (5' 10.27\")   Wt 120.4 kg (265 lb 6.4 oz)   SpO2 94%   BMI 37.79 kg/m    There is no " height or weight on file to calculate BMI.  Physical Exam   Pt is alert and oriented in no acute distress.  Affect is appropriate. Good eye contact.          Signed Electronically by: Daisha Orlando MD

## 2025-02-02 DIAGNOSIS — E11.8 TYPE 2 DIABETES MELLITUS WITH UNSPECIFIED COMPLICATIONS (H): ICD-10-CM

## 2025-02-03 RX ORDER — SEMAGLUTIDE 0.68 MG/ML
0.5 INJECTION, SOLUTION SUBCUTANEOUS
OUTPATIENT
Start: 2025-02-03

## 2025-02-24 ENCOUNTER — MYC MEDICAL ADVICE (OUTPATIENT)
Dept: FAMILY MEDICINE | Facility: CLINIC | Age: 70
End: 2025-02-24
Payer: COMMERCIAL

## 2025-03-15 ENCOUNTER — HEALTH MAINTENANCE LETTER (OUTPATIENT)
Age: 70
End: 2025-03-15

## 2025-03-25 ENCOUNTER — MYC MEDICAL ADVICE (OUTPATIENT)
Dept: FAMILY MEDICINE | Facility: CLINIC | Age: 70
End: 2025-03-25
Payer: COMMERCIAL

## 2025-03-25 ENCOUNTER — TELEPHONE (OUTPATIENT)
Dept: FAMILY MEDICINE | Facility: CLINIC | Age: 70
End: 2025-03-25
Payer: COMMERCIAL

## 2025-03-25 NOTE — TELEPHONE ENCOUNTER
Would it be beneficial for patient to keep appt for this Friday to follow up on diabetes or should he reschedule for after procedure?    Last A1c 12/4/24= 9.3    Routing to provider, please advise.

## 2025-03-25 NOTE — TELEPHONE ENCOUNTER
Patient Quality Outreach    Patient is due for the following:   Diabetes -  A1C    Action(s) Taken:   Patient has upcoming appointment, these items will be addressed at that time.    Postponing for appt 3/28    Type of outreach:    Chart review performed, no outreach needed.    Questions for provider review:    None         Zahida Judd MA  Chart routed to Care Team.

## 2025-03-27 ENCOUNTER — LAB (OUTPATIENT)
Dept: LAB | Facility: CLINIC | Age: 70
End: 2025-03-27
Payer: COMMERCIAL

## 2025-03-27 DIAGNOSIS — N18.31 STAGE 3A CHRONIC KIDNEY DISEASE (H): ICD-10-CM

## 2025-03-27 DIAGNOSIS — Z79.4 TYPE 2 DIABETES MELLITUS WITH DIABETIC NEUROPATHY, WITH LONG-TERM CURRENT USE OF INSULIN (H): ICD-10-CM

## 2025-03-27 DIAGNOSIS — E11.40 TYPE 2 DIABETES MELLITUS WITH DIABETIC NEUROPATHY, WITH LONG-TERM CURRENT USE OF INSULIN (H): ICD-10-CM

## 2025-03-27 DIAGNOSIS — E11.8 TYPE 2 DIABETES MELLITUS WITH COMPLICATION, WITHOUT LONG-TERM CURRENT USE OF INSULIN (H): ICD-10-CM

## 2025-03-27 LAB
ANION GAP SERPL CALCULATED.3IONS-SCNC: 12 MMOL/L (ref 7–15)
BUN SERPL-MCNC: 21.3 MG/DL (ref 8–23)
CALCIUM SERPL-MCNC: 10 MG/DL (ref 8.8–10.4)
CHLORIDE SERPL-SCNC: 103 MMOL/L (ref 98–107)
CREAT SERPL-MCNC: 1.36 MG/DL (ref 0.67–1.17)
EGFRCR SERPLBLD CKD-EPI 2021: 56 ML/MIN/1.73M2
EST. AVERAGE GLUCOSE BLD GHB EST-MCNC: 120 MG/DL
GLUCOSE SERPL-MCNC: 138 MG/DL (ref 70–99)
HBA1C MFR BLD: 5.8 % (ref 0–5.6)
HCO3 SERPL-SCNC: 23 MMOL/L (ref 22–29)
POTASSIUM SERPL-SCNC: 4.4 MMOL/L (ref 3.4–5.3)
SODIUM SERPL-SCNC: 138 MMOL/L (ref 135–145)

## 2025-03-28 PROBLEM — G98.8 DISORDER OF NERVOUS SYSTEM DUE TO TYPE 2 DIABETES MELLITUS (H): Status: ACTIVE | Noted: 2025-03-28

## 2025-03-28 PROBLEM — E11.69 DISORDER OF NERVOUS SYSTEM DUE TO TYPE 2 DIABETES MELLITUS (H): Status: ACTIVE | Noted: 2025-03-28

## 2025-04-04 ENCOUNTER — APPOINTMENT (OUTPATIENT)
Dept: GENERAL RADIOLOGY | Facility: CLINIC | Age: 70
End: 2025-04-04
Attending: EMERGENCY MEDICINE
Payer: COMMERCIAL

## 2025-04-04 ENCOUNTER — HOSPITAL ENCOUNTER (EMERGENCY)
Facility: CLINIC | Age: 70
Discharge: HOME | End: 2025-04-04
Attending: EMERGENCY MEDICINE | Admitting: EMERGENCY MEDICINE
Payer: COMMERCIAL

## 2025-04-04 VITALS
OXYGEN SATURATION: 100 % | WEIGHT: 260 LBS | SYSTOLIC BLOOD PRESSURE: 121 MMHG | HEART RATE: 86 BPM | BODY MASS INDEX: 36.4 KG/M2 | RESPIRATION RATE: 16 BRPM | HEIGHT: 71 IN | DIASTOLIC BLOOD PRESSURE: 69 MMHG | TEMPERATURE: 98.8 F

## 2025-04-04 DIAGNOSIS — Z93.6 NEPHROSTOMY PRESENT (H): ICD-10-CM

## 2025-04-04 DIAGNOSIS — N12 PYELONEPHRITIS: ICD-10-CM

## 2025-04-04 DIAGNOSIS — R50.82 FEVER POSTOP: ICD-10-CM

## 2025-04-04 LAB
ALBUMIN SERPL BCG-MCNC: 3.9 G/DL (ref 3.5–5.2)
ALBUMIN UR-MCNC: 100 MG/DL
ALBUMIN UR-MCNC: NEGATIVE MG/DL
ALP SERPL-CCNC: 88 U/L (ref 40–150)
ALT SERPL W P-5'-P-CCNC: 24 U/L (ref 0–70)
ANION GAP SERPL CALCULATED.3IONS-SCNC: 13 MMOL/L (ref 7–15)
APPEARANCE UR: ABNORMAL
APPEARANCE UR: CLEAR
AST SERPL W P-5'-P-CCNC: 16 U/L (ref 0–45)
BASOPHILS # BLD AUTO: 0.1 10E3/UL (ref 0–0.2)
BASOPHILS NFR BLD AUTO: 1 %
BILIRUB SERPL-MCNC: 0.4 MG/DL
BILIRUB UR QL STRIP: NEGATIVE
BILIRUB UR QL STRIP: NEGATIVE
BUN SERPL-MCNC: 18.2 MG/DL (ref 8–23)
CALCIUM SERPL-MCNC: 9.4 MG/DL (ref 8.8–10.4)
CHLORIDE SERPL-SCNC: 99 MMOL/L (ref 98–107)
COLOR UR AUTO: ABNORMAL
COLOR UR AUTO: ABNORMAL
CREAT SERPL-MCNC: 1.23 MG/DL (ref 0.67–1.17)
EGFRCR SERPLBLD CKD-EPI 2021: 64 ML/MIN/1.73M2
EOSINOPHIL # BLD AUTO: 0.1 10E3/UL (ref 0–0.7)
EOSINOPHIL NFR BLD AUTO: 1 %
ERYTHROCYTE [DISTWIDTH] IN BLOOD BY AUTOMATED COUNT: 13 % (ref 10–15)
FLUAV RNA SPEC QL NAA+PROBE: NEGATIVE
FLUBV RNA RESP QL NAA+PROBE: NEGATIVE
GLUCOSE SERPL-MCNC: 154 MG/DL (ref 70–99)
GLUCOSE UR STRIP-MCNC: NEGATIVE MG/DL
GLUCOSE UR STRIP-MCNC: NEGATIVE MG/DL
HCO3 SERPL-SCNC: 22 MMOL/L (ref 22–29)
HCT VFR BLD AUTO: 38.2 % (ref 40–53)
HGB BLD-MCNC: 13.3 G/DL (ref 13.3–17.7)
HGB UR QL STRIP: ABNORMAL
HGB UR QL STRIP: ABNORMAL
HOLD SPECIMEN: NORMAL
IMM GRANULOCYTES # BLD: 0.1 10E3/UL
IMM GRANULOCYTES NFR BLD: 1 %
KETONES UR STRIP-MCNC: NEGATIVE MG/DL
KETONES UR STRIP-MCNC: NEGATIVE MG/DL
LEUKOCYTE ESTERASE UR QL STRIP: ABNORMAL
LEUKOCYTE ESTERASE UR QL STRIP: ABNORMAL
LYMPHOCYTES # BLD AUTO: 0.6 10E3/UL (ref 0.8–5.3)
LYMPHOCYTES NFR BLD AUTO: 4 %
MCH RBC QN AUTO: 29.9 PG (ref 26.5–33)
MCHC RBC AUTO-ENTMCNC: 34.8 G/DL (ref 31.5–36.5)
MCV RBC AUTO: 86 FL (ref 78–100)
MONOCYTES # BLD AUTO: 0.7 10E3/UL (ref 0–1.3)
MONOCYTES NFR BLD AUTO: 4 %
MUCOUS THREADS #/AREA URNS LPF: PRESENT /LPF
MUCOUS THREADS #/AREA URNS LPF: PRESENT /LPF
NEUTROPHILS # BLD AUTO: 15.1 10E3/UL (ref 1.6–8.3)
NEUTROPHILS NFR BLD AUTO: 90 %
NITRATE UR QL: NEGATIVE
NITRATE UR QL: POSITIVE
NRBC # BLD AUTO: 0 10E3/UL
NRBC BLD AUTO-RTO: 0 /100
PH UR STRIP: 5.5 [PH] (ref 5–7)
PH UR STRIP: 7.5 [PH] (ref 5–7)
PLATELET # BLD AUTO: 233 10E3/UL (ref 150–450)
POTASSIUM SERPL-SCNC: 4.2 MMOL/L (ref 3.4–5.3)
PROT SERPL-MCNC: 7.4 G/DL (ref 6.4–8.3)
RBC # BLD AUTO: 4.45 10E6/UL (ref 4.4–5.9)
RBC URINE: 138 /HPF
RBC URINE: 5 /HPF
RENAL TUB EPI: 2 /HPF
RSV RNA SPEC NAA+PROBE: NEGATIVE
SARS-COV-2 RNA RESP QL NAA+PROBE: NEGATIVE
SODIUM SERPL-SCNC: 134 MMOL/L (ref 135–145)
SP GR UR STRIP: 1.01 (ref 1–1.03)
SP GR UR STRIP: 1.01 (ref 1–1.03)
SQUAMOUS EPITHELIAL: <1 /HPF
UROBILINOGEN UR STRIP-MCNC: NORMAL MG/DL
UROBILINOGEN UR STRIP-MCNC: NORMAL MG/DL
WBC # BLD AUTO: 16.7 10E3/UL (ref 4–11)
WBC CLUMPS #/AREA URNS HPF: PRESENT /HPF
WBC URINE: 67 /HPF
WBC URINE: >182 /HPF

## 2025-04-04 PROCEDURE — 99284 EMERGENCY DEPT VISIT MOD MDM: CPT | Mod: 25 | Performed by: EMERGENCY MEDICINE

## 2025-04-04 PROCEDURE — 87040 BLOOD CULTURE FOR BACTERIA: CPT | Performed by: EMERGENCY MEDICINE

## 2025-04-04 PROCEDURE — 85025 COMPLETE CBC W/AUTO DIFF WBC: CPT | Performed by: EMERGENCY MEDICINE

## 2025-04-04 PROCEDURE — 99284 EMERGENCY DEPT VISIT MOD MDM: CPT | Performed by: EMERGENCY MEDICINE

## 2025-04-04 PROCEDURE — 81003 URINALYSIS AUTO W/O SCOPE: CPT | Performed by: EMERGENCY MEDICINE

## 2025-04-04 PROCEDURE — 87637 SARSCOV2&INF A&B&RSV AMP PRB: CPT | Performed by: EMERGENCY MEDICINE

## 2025-04-04 PROCEDURE — 87186 SC STD MICRODIL/AGAR DIL: CPT | Performed by: EMERGENCY MEDICINE

## 2025-04-04 PROCEDURE — 87088 URINE BACTERIA CULTURE: CPT | Performed by: EMERGENCY MEDICINE

## 2025-04-04 PROCEDURE — 84155 ASSAY OF PROTEIN SERUM: CPT | Performed by: EMERGENCY MEDICINE

## 2025-04-04 PROCEDURE — 36415 COLL VENOUS BLD VENIPUNCTURE: CPT | Performed by: EMERGENCY MEDICINE

## 2025-04-04 PROCEDURE — 82435 ASSAY OF BLOOD CHLORIDE: CPT | Performed by: EMERGENCY MEDICINE

## 2025-04-04 PROCEDURE — 84075 ASSAY ALKALINE PHOSPHATASE: CPT | Performed by: EMERGENCY MEDICINE

## 2025-04-04 PROCEDURE — 250N000013 HC RX MED GY IP 250 OP 250 PS 637: Performed by: EMERGENCY MEDICINE

## 2025-04-04 PROCEDURE — 71046 X-RAY EXAM CHEST 2 VIEWS: CPT

## 2025-04-04 RX ORDER — SULFAMETHOXAZOLE AND TRIMETHOPRIM 800; 160 MG/1; MG/1
1 TABLET ORAL ONCE
Status: COMPLETED | OUTPATIENT
Start: 2025-04-04 | End: 2025-04-04

## 2025-04-04 RX ORDER — SULFAMETHOXAZOLE AND TRIMETHOPRIM 800; 160 MG/1; MG/1
1 TABLET ORAL 2 TIMES DAILY
Qty: 20 TABLET | Refills: 0 | Status: SHIPPED | OUTPATIENT
Start: 2025-04-04 | End: 2025-04-14

## 2025-04-04 RX ADMIN — SULFAMETHOXAZOLE AND TRIMETHOPRIM 1 TABLET: 800; 160 TABLET ORAL at 22:11

## 2025-04-04 ASSESSMENT — ACTIVITIES OF DAILY LIVING (ADL)
ADLS_ACUITY_SCORE: 41

## 2025-04-04 ASSESSMENT — COLUMBIA-SUICIDE SEVERITY RATING SCALE - C-SSRS
1. IN THE PAST MONTH, HAVE YOU WISHED YOU WERE DEAD OR WISHED YOU COULD GO TO SLEEP AND NOT WAKE UP?: NO
2. HAVE YOU ACTUALLY HAD ANY THOUGHTS OF KILLING YOURSELF IN THE PAST MONTH?: NO
6. HAVE YOU EVER DONE ANYTHING, STARTED TO DO ANYTHING, OR PREPARED TO DO ANYTHING TO END YOUR LIFE?: NO

## 2025-04-05 NOTE — ED TRIAGE NOTES
Fever and chills starting around 4pm. Pt just discharged from [facility] today after having a neph tube placed this AM. Pt also has a urostomy which has been there for a year. Pt being treated for prostate CA.   Triage Assessment (Adult)       Row Name 04/04/25 2005          Triage Assessment    Airway WDL WDL        Respiratory WDL    Respiratory WDL WDL        Skin Circulation/Temperature WDL    Skin Circulation/Temperature WDL WDL        Cardiac WDL    Cardiac WDL WDL        Peripheral/Neurovascular WDL    Peripheral Neurovascular WDL WDL        Cognitive/Neuro/Behavioral WDL    Cognitive/Neuro/Behavioral WDL WDL

## 2025-04-05 NOTE — DISCHARGE INSTRUCTIONS
Take antibiotics as prescribed.    Follow-up with your urologist, or primary care providers as needed.    Be seen if high fevers, persistent vomiting, severe worsening of pain, or other concerns develop.

## 2025-04-05 NOTE — ED PROVIDER NOTES
ED Provider Note  [facility]      History     Chief Complaint   Patient presents with    Fever     Fever and chills starting around 4pm. Pt just discharged from [place] today after having a neph tube placed this AM. Pt also has a urostomy which has been there for a year. Pt being treated for prostate CA.     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  Patient has had recent follow-up, and notable for left-sided hydronephrosis, and ultimately had left-sided nephrostomy tube which was recommended, and this was placed earlier today at [facility].    I reviewed recent outpatient visits including the procedure note from earlier today.  Uncomplicated procedure, with left-sided nephrostomy tube which was placed.  Patient has had good working ileal conduit with adequate urine output recently.  Denies any recent issues with hematuria, abdominal pain, or recent fevers up until this afternoon.  Patient went home this afternoon, and beginning this afternoon, and early evening developed a fever, which was temporal, up to 103 degrees.  He called his clinic, who recommended that he take acetaminophen, and be seen in the emergency department.  Patient denies any change of urine output.  Has had slight cough, however this is primarily chronic.  No rash.  No other ill contacts.              Independent Historian:    I reviewed [facility] notes from previous:  68 y.o. male Robotic radical cystoprostatectomy w/ intracorporeal ileal conduit 04/12/2024: prostate leiomyosarcoma extending to bladder, [id number]     Review of External Notes:  As above.        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     Priority: Medium    Stage 3a chronic kidney disease (H) 12/10/2024     Priority: Medium    Class 2 severe obesity due to excess calories with serious comorbidity in adult (H) 11/15/2024     Priority: Medium    Leiomyosarcoma (H) 11/15/2024     Priority: Medium     At junction of prostate and bladder   Removed  2024 - monitored by oncology       Status post ileal conduit (H) 2024     Priority: Medium    Malignant neoplasm of bladder (H) 2024     Priority: Medium    Primary malignant neoplasm of prostate (H) 01/15/2024     Priority: Medium    Benign prostatic hyperplasia 2023     Priority: Medium    Plantar porokeratosis, acquired 2021     Priority: Medium    Arteriosclerosis of coronary artery 10/16/2019     Priority: Medium      CT coronary angio showing a mild LAD stenosis. Nuclear stress test showed a small inferolateral ischemic area. Repeat nuclear stress test in  showed no inducible ischemia.  2019: Nuclear stress test again negative for inducible ischemia      KARLEE (obstructive sleep apnea) 10/16/2019     Priority: Medium     PS19 - RDI 13, AHI 7.2, REM RDI 0, Supine RDI 33.6, and low oxygen 86% with 1.2 minutes <88%.   Titration: 19 - Set 9 cmH20 optimal.      12/10/20: AHI at goal at 1.7. Great compliance. Set 11 cmH20.      Type 2 diabetes mellitus with complication, without long-term current use of insulin (H) 2017     Priority: Medium    Gastroesophageal reflux disease 2017     Priority: Medium    Hyperlipidemia 2017     Priority: Medium    Nonalcoholic steatohepatitis (SUGGS) 2017     Priority: Medium    Primary hypertension 2017     Priority: Medium    Gout 2013     Priority: Medium        Past Medical  History:    Past Medical History:   Diagnosis Date    Arthritis     Cancer (H) 2024    Diabetes (H)     Heart disease     Hypertension        Past Surgical History:    Past Surgical History:   Procedure Laterality Date    ABLATION OF DYSRHYTHMIC FOCUS      BIOPSY  2024    Sarcoma    CARDIAC CATHETERIZATION      CARDIAC SURGERY      Ablation fot WPW    COLONOSCOPY      ENT SURGERY      Straighten deviated septum    GENITOURINARY SURGERY  2024    Remove bladder and prostate    ORTHOPEDIC SURGERY      Both sholders, ankle, left knee - maniscus repair       Family History:    Family History   Problem Relation Age of Onset    Coronary Artery Disease Father     Breast Cancer Mother        Social History:  Marital Status:   [2]  Social History     Tobacco Use    Smoking status: Former     Current packs/day: 0.00     Average packs/day: 1.5 packs/day for 10.0 years (15.0 ttl pk-yrs)     Types: Cigarettes, Cigars, Pipe     Start date: 1975     Quit date: 1985     Years since quittin.2    Smokeless tobacco: Never   Vaping Use    Vaping status: Never Used   Substance Use Topics    Alcohol use: Yes    Drug use: Yes     Types: Marijuana        Medications:    sulfamethoxazole-trimethoprim (BACTRIM DS) 800-160 MG tablet  aspirin (ASA) 81 MG chewable tablet  atorvastatin (LIPITOR) 20 MG tablet  blood glucose (NO BRAND SPECIFIED) test strip  blood glucose monitoring (NO BRAND SPECIFIED) meter device kit  Continuous Glucose Sensor (FREESTYLE NAT 3 PLUS SENSOR) MISC  Continuous Glucose Sensor (FREESTYLE NAT 3 SENSOR) MISC  Continuous Glucose Sensor (FREESTYLE NAT 3 SENSOR) MISC  Ferrous Gluconate 240 (27 Fe) MG TABS  Insulin Aspart FlexPen 100 UNIT/ML SOPN  insulin degludec (TRESIBA FLEXTOUCH) 100 UNIT/ML pen  insulin pen needle (32G X 4 MM) 32G X 4 MM miscellaneous  lisinopril (ZESTRIL) 20 MG tablet  loratadine (CLARITIN) 10 MG tablet  omeprazole (PRILOSEC) 20 MG DR capsule  Semaglutide, 1  "MG/DOSE, (OZEMPIC, 1 MG/DOSE,) 4 MG/3ML pen  thin (NO BRAND SPECIFIED) lancets  ULORIC 40 mg tablet          Review of Systems  A medically appropriate review of systems was performed with pertinent positives and negatives noted in the HPI, and all other systems negative.    Physical Exam   Patient Vitals for the past 24 hrs:   BP Temp Temp src Pulse Resp SpO2 Height Weight   04/04/25 2212 121/69 -- -- 86 -- 100 % -- --   04/04/25 2030 113/67 -- -- 97 -- 95 % -- --   04/04/25 2006 127/77 98.9  F (37.2  C) Oral 100 16 97 % 1.791 m (5' 10.5\") 117.9 kg (260 lb)          Physical Exam  General: alert and in no acute distress on arrival  Head: atraumatic, normocephalic  Lungs:  nonlabored  CV:  extremities warm and perfused  Back: Left-sided nephrostomy tube in place.  Clear urine present in the bag.  Abd: nondistended.  Urostomy in place with clear urine  Skin: no rashes, no diaphoresis and skin color normal  Neuro: Patient awake, alert, speech is fluent,   Psychiatric: affect/mood normal,        ED Course                 Procedures                 None         Results for orders placed or performed during the hospital encounter of 04/04/25 (from the past 24 hours)   Influenza A/B, RSV and SARS-CoV2 PCR (COVID-19) Nose    Specimen: Nose; Swab   Result Value Ref Range    Influenza A PCR Negative Negative    Influenza B PCR Negative Negative    RSV PCR Negative Negative    SARS CoV2 PCR Negative Negative    Narrative    Testing was performed using the Xpert Xpress CoV2/Flu/RSV Assay on the [employer] GeneXpert Instrument. This test should be ordered for the detection of SARS-CoV2, influenza, and RSV viruses in individuals with signs and symptoms of respiratory tract infection. This test is for in vitro diagnostic use under the [place] FDA for laboratories certified under CLIA to perform high or moderate complexity testing. This test has been [place] FDA cleared. A negative result does not rule out the presence of PCR inhibitors in the " specimen or target RNA in concentration below the limit of detection for the assay. If only one viral target is positive but coinfection with multiple targets is suspected, the sample should be re-tested with another FDA cleared, approved, or authorized test, if coninfection would change clinical management. This test was validated by the [facility]. These laboratories are certified under the Clinical Laboratory Improvement Amendments of 1988 (CLIA-88) as qualified to perfom high complexity laboratory testing.   [place] [facility]    Narrative    The following orders were created for panel order [place] [facility].  Procedure                               Abnormality         Status                     ---------                               -----------         ------                     Extra Blood Culture Bottle[[id number]]                      Final result               Extra Blue Top Tube[[id number]]                             Final result               Extra Green Top (Lithiu...[[id number]]                      Final result               Extra Purple Top Tube[[id number]]                           Final result                 Please view results for these tests on the individual orders.   CBC with platelets, differential    Narrative    The following orders were created for panel order CBC with platelets, differential.  Procedure                               Abnormality         Status                     ---------                               -----------         ------                     CBC with platelets and ...[[id number]]  Abnormal            Final result                 Please view results for these tests on the individual orders.   Comprehensive metabolic panel   Result Value Ref Range    Sodium 134 (L) 135 - 145 mmol/L    Potassium 4.2 3.4 - 5.3 mmol/L    Carbon Dioxide (CO2) 22 22 - 29 mmol/L    Anion Gap 13 7 - 15 mmol/L    Urea Nitrogen 18.2 8.0 - 23.0 mg/dL    Creatinine 1.23 (H) 0.67 -  1.17 mg/dL    GFR Estimate 64 >60 mL/min/1.73m2    Calcium 9.4 8.8 - 10.4 mg/dL    Chloride 99 98 - 107 mmol/L    Glucose 154 (H) 70 - 99 mg/dL    Alkaline Phosphatase 88 40 - 150 U/L    AST 16 0 - 45 U/L    ALT 24 0 - 70 U/L    Protein Total 7.4 6.4 - 8.3 g/dL    Albumin 3.9 3.5 - 5.2 g/dL    Bilirubin Total 0.4 <=1.2 mg/dL   [place] Draw    Narrative    The following orders were created for panel order [place] Draw.  Procedure                               Abnormality         Status                     ---------                               -----------         ------                     Extra Red Top Tube[[id number]]                                                         Extra Green Top (Lithiu...[[id number]]                                                 Extra Purple Top Tube[[id number]]                                                      Extra Green Top (Lithiu...[[id number]]                      Final result                 Please view results for these tests on the individual orders.   Extra Blood Culture Bottle   Result Value Ref Range    Hold Specimen JIC    Extra Blue Top Tube   Result Value Ref Range    Hold Specimen JIC    Extra Green Top (Lithium Heparin) Tube   Result Value Ref Range    Hold Specimen JIC    Extra Purple Top Tube   Result Value Ref Range    Hold Specimen     CBC with platelets and differential   Result Value Ref Range    WBC Count 16.7 (H) 4.0 - 11.0 10e3/uL    RBC Count 4.45 4.40 - 5.90 10e6/uL    Hemoglobin 13.3 13.3 - 17.7 g/dL    Hematocrit 38.2 (L) 40.0 - 53.0 %    MCV 86 78 - 100 fL    MCH 29.9 26.5 - 33.0 pg    MCHC 34.8 31.5 - 36.5 g/dL    RDW 13.0 10.0 - 15.0 %    Platelet Count 233 150 - 450 10e3/uL    % Neutrophils 90 %    % Lymphocytes 4 %    % Monocytes 4 %    % Eosinophils 1 %    % Basophils 1 %    % Immature Granulocytes 1 %    NRBCs per 100 WBC 0 <1 /100    Absolute Neutrophils 15.1 (H) 1.6 - 8.3 10e3/uL    Absolute Lymphocytes 0.6 (L) 0.8 - 5.3 10e3/uL    Absolute  Monocytes 0.7 0.0 - 1.3 10e3/uL    Absolute Eosinophils 0.1 0.0 - 0.7 10e3/uL    Absolute Basophils 0.1 0.0 - 0.2 10e3/uL    Absolute Immature Granulocytes 0.1 <=0.4 10e3/uL    Absolute NRBCs 0.0 10e3/uL   Extra Green Top (Lithium Heparin) ON ICE   Result Value Ref Range    Hold Specimen     UA Macroscopic with reflex to Microscopic and Culture    Specimen: Nephrostomy, Left; Urine   Result Value Ref Range    Color Urine Orange (A) Colorless, Straw, Light Yellow, Yellow    Appearance Urine Slightly Cloudy (A) Clear    Glucose Urine Negative Negative mg/dL    Bilirubin Urine Negative Negative    Ketones Urine Negative Negative mg/dL    Specific Gravity Urine 1.009 1.003 - 1.035    Blood Urine Large (A) Negative    pH Urine 5.5 5.0 - 7.0    Protein Albumin Urine 100 (A) Negative mg/dL    Urobilinogen Urine Normal Normal mg/dL    Nitrite Urine Negative Negative    Leukocyte Esterase Urine Large (A) Negative    WBC Clumps Urine Present (A) None Seen /HPF    Mucus Urine Present (A) None Seen /LPF    RBC Urine 138 (H) <=2 /HPF    WBC Urine >182 (H) <=5 /HPF    Narrative    Urine Culture ordered based on laboratory criteria   UA Macroscopic with reflex to Microscopic and Culture    Specimen: Urostomy; Urine   Result Value Ref Range    Color Urine Light Yellow Colorless, Straw, Light Yellow, Yellow    Appearance Urine Clear Clear    Glucose Urine Negative Negative mg/dL    Bilirubin Urine Negative Negative    Ketones Urine Negative Negative mg/dL    Specific Gravity Urine 1.010 1.003 - 1.035    Blood Urine Trace (A) Negative    pH Urine 7.5 (H) 5.0 - 7.0    Protein Albumin Urine Negative Negative mg/dL    Urobilinogen Urine Normal Normal mg/dL    Nitrite Urine Positive (A) Negative    Leukocyte Esterase Urine Large (A) Negative    Mucus Urine Present (A) None Seen /LPF    RBC Urine 5 (H) <=2 /HPF    WBC Urine 67 (H) <=5 /HPF    Squamous Epithelials Urine <1 <=1 /HPF    Renal Tubular Epithelials Urine 2 (H) None Seen /HPF     Narrative    Urine Culture ordered based on laboratory criteria   Chest XR,  PA & LAT    Narrative    EXAM: XR CHEST 2 VIEWS  LOCATION: [facility]  DATE: 4/4/2025    INDICATION: fever.  recent percutaneous nephrostomy tube placed earlier today  COMPARISON: None.      Impression    IMPRESSION: There some minimal atelectasis in the left lower lobe. Chest otherwise negative.       MEDICATIONS GIVEN IN THE EMERGENCY DEPARTMENT:  Medications   sulfamethoxazole-trimethoprim (BACTRIM DS) 800-160 MG per tablet 1 tablet (1 tablet Oral $Given 4/4/25 6108)           Independent Interpretation (X-rays, CTs, rhythm strip):  CXR imaging personally reviewed.  No infiltrate.  Minimal atelectasis is present.    Consultations/Discussion of Management or Tests:  I discussed with urologist, Dr. Lundy, at [facility].  Discussed that frequently after these procedures there can be SIRS type response.  The obstruction from the left kidney has now been relieved, and patient can develop this inflammatory type response.  However, recommended treating with antibiotics for pyelonephritis type condition.  Most accurate urine sample would be from the nephrostomy tube.       Social Determinants of Health affecting care:         Assessments & Plan (with Medical Decision Making)  69 year old male who presents to the Emergency Department for evaluation of fever in the context of a left nephrostomy tube which was placed earlier today.  Patient with history of prostate leiomyosarcoma, with history of radical cystoprostatectomy that was performed in April, 2024.  Given prior episodes of persistent hydronephrosis had the nephrostomy tube placed.  Patient had 1 episode of fever at home, by temporal thermometer.  He arrives afebrile in the emergency department, borderline tachycardic, and otherwise normal vitals.    Initial workup geared towards potential for sepsis related condition.  Patient had blood test showing  creatinine which is improved compared to prior.  It is currently 1.23.  Does have elevated white blood cell count at 16,000, and urine samples are obtained from both the nephrostomy bag can, in addition to the urostomy bag.    After testing results, I did have discussion with urologist at [facility].  See note as  above.  In short, patient would benefit from antibiotic treatment.  This would be for potential pyelonephritis type condition as patient did have relief of the obstruction as patient did have stricture of the ureter on the left.  Therefore, will require follow-up, primarily of the urine sample from the nephrostomy bag.  Bactrim has been prescribed.  Return precautions are discussed.  I do feel patient is well-appearing, nontoxic, afebrile currently, no pain, and discharge home is appropriate, with close monitoring, and return precautions discussed if new or worsening symptoms develop.       I have reviewed the nursing notes.    I have reviewed the findings, diagnosis, plan and need for follow up with the patient.         Medical Decision Making  The patient's presentation was of high complexity (an acute health issue posing potential threat to life or bodily function).    The patient's evaluation involved:  review of external note(s) from 2 sources (see separate area of note for details)  ordering and/or review of 3+ test(s) in this encounter (see separate area of note for details)  discussion of management or test interpretation with another health professional (see separate area of note for details)    The patient's management necessitated moderate risk (prescription drug management including medications given in the ED).        NEW PRESCRIPTIONS STARTED AT TODAY'S ER VISIT  Current Discharge Medication List        START taking these medications    Details   sulfamethoxazole-trimethoprim (BACTRIM DS) 800-160 MG tablet Take 1 tablet by mouth 2 times daily for 10 days.  Qty: 20 tablet, Refills: 0              Final diagnoses:   Fever postop   Nephrostomy present (H)   Pyelonephritis       4/4/2025   [facility] EMERGENCY DEPT       Dariel Whitaker MD  04/04/25 6019

## 2025-04-07 LAB
BACTERIA UR CULT: ABNORMAL
BACTERIA UR CULT: ABNORMAL
BACTERIA UR CULT: NO GROWTH

## 2025-04-08 ENCOUNTER — TELEPHONE (OUTPATIENT)
Dept: NURSING | Facility: CLINIC | Age: 70
End: 2025-04-08
Payer: COMMERCIAL

## 2025-04-08 ENCOUNTER — MYC MEDICAL ADVICE (OUTPATIENT)
Dept: FAMILY MEDICINE | Facility: CLINIC | Age: 70
End: 2025-04-08
Payer: COMMERCIAL

## 2025-04-08 DIAGNOSIS — E11.8 TYPE 2 DIABETES MELLITUS WITH COMPLICATION, WITHOUT LONG-TERM CURRENT USE OF INSULIN (H): ICD-10-CM

## 2025-04-08 DIAGNOSIS — E66.01 CLASS 2 SEVERE OBESITY DUE TO EXCESS CALORIES WITH SERIOUS COMORBIDITY AND BODY MASS INDEX (BMI) OF 38.0 TO 38.9 IN ADULT (H): Primary | ICD-10-CM

## 2025-04-08 DIAGNOSIS — E66.812 CLASS 2 SEVERE OBESITY DUE TO EXCESS CALORIES WITH SERIOUS COMORBIDITY AND BODY MASS INDEX (BMI) OF 38.0 TO 38.9 IN ADULT (H): Primary | ICD-10-CM

## 2025-04-08 NOTE — TELEPHONE ENCOUNTER
[facility]    Reason for call: Lab Result Notification     Lab Result (including Rx patient on, if applicable).  If culture, copy of lab report at bottom.  Lab Result: Urine Culture -Urostomy - See Below  Urine Culture - Nephrostomy - See Below    ED Rx: sulfamethoxazole-trimethoprim (BACTRIM DS) 800-160 MG tablet - Take 1 tablet by mouth 2 times daily for 10 days   50,000-100,000 CFU/mL Pseudomonas aeruginosa Abnormal  (NOT TESTED)  10,000-50,000 CFU/mL Enterococcus faecalis Abnormal  (NOT TESTED)    Creatinine Level (mg/dl)   Creatinine   Date Value Ref Range Status   04/04/2025 1.23 (H) 0.67 - 1.17 mg/dL Final    Creatinine clearance (ml/min), if applicable    Serum creatinine: 1.23 mg/dL (H) 04/04/25 2016  Estimated creatinine clearance: 73.5 mL/min (A)     ED Symptoms: Presented to the ED with fevers and chills. Patient had a nephrostomy tube placed earlier in the day at [place].     Per the ED Note:      Current Symptoms: Patient did not want to discuss symptoms. Patient was irritated that we needed to ask for his personal information.  Patient advised that the urine culture from his nephrostomy tube was negative. Patient advised to follow up with his urologist at [place] for recommendations on continuing the antibiotic or not. Patient stated he would do that.     RN Recommendations/Instructions per [place] ED lab result protocol:   [facility] ED lab result protocol utilized: Urine Culture           JOANNA REY RN

## 2025-04-08 NOTE — TELEPHONE ENCOUNTER
See My chart message.     Was seen in ER on 4-4-25 and is being treated for Pylonephritis with bactrim per chart review.       Please advise. Rere Richard RN

## 2025-04-09 LAB
BACTERIA BLD CULT: NO GROWTH
BACTERIA BLD CULT: NO GROWTH

## 2025-05-05 ENCOUNTER — MYC REFILL (OUTPATIENT)
Dept: FAMILY MEDICINE | Facility: CLINIC | Age: 70
End: 2025-05-05
Payer: COMMERCIAL

## 2025-05-05 DIAGNOSIS — E11.8 TYPE 2 DIABETES MELLITUS WITH COMPLICATION, WITHOUT LONG-TERM CURRENT USE OF INSULIN (H): ICD-10-CM

## 2025-05-30 DIAGNOSIS — E11.8 TYPE 2 DIABETES MELLITUS WITH COMPLICATION, WITHOUT LONG-TERM CURRENT USE OF INSULIN (H): ICD-10-CM

## 2025-06-03 RX ORDER — SEMAGLUTIDE 1.34 MG/ML
1 INJECTION, SOLUTION SUBCUTANEOUS
Qty: 3 ML | Refills: 0 | Status: SHIPPED | OUTPATIENT
Start: 2025-06-03

## 2025-06-13 ENCOUNTER — MYC MEDICAL ADVICE (OUTPATIENT)
Dept: FAMILY MEDICINE | Facility: CLINIC | Age: 70
End: 2025-06-13
Payer: COMMERCIAL

## 2025-06-13 DIAGNOSIS — E11.8 TYPE 2 DIABETES MELLITUS WITH COMPLICATION, WITHOUT LONG-TERM CURRENT USE OF INSULIN (H): Primary | ICD-10-CM

## 2025-06-17 RX ORDER — HYDROCHLOROTHIAZIDE 12.5 MG/1
CAPSULE ORAL
Qty: 6 EACH | Refills: 1 | Status: SHIPPED | OUTPATIENT
Start: 2025-06-17

## 2025-06-28 ENCOUNTER — HEALTH MAINTENANCE LETTER (OUTPATIENT)
Age: 70
End: 2025-06-28

## 2025-07-01 ENCOUNTER — RESULTS FOLLOW-UP (OUTPATIENT)
Dept: FAMILY MEDICINE | Facility: CLINIC | Age: 70
End: 2025-07-01

## 2025-07-01 ENCOUNTER — LAB (OUTPATIENT)
Dept: LAB | Facility: CLINIC | Age: 70
End: 2025-07-01
Payer: COMMERCIAL

## 2025-07-01 DIAGNOSIS — G98.8 DISORDER OF NERVOUS SYSTEM DUE TO TYPE 2 DIABETES MELLITUS (H): Primary | ICD-10-CM

## 2025-07-01 DIAGNOSIS — E11.69 DISORDER OF NERVOUS SYSTEM DUE TO TYPE 2 DIABETES MELLITUS (H): Primary | ICD-10-CM

## 2025-07-01 LAB
EST. AVERAGE GLUCOSE BLD GHB EST-MCNC: 120 MG/DL
HBA1C MFR BLD: 5.8 % (ref 0–5.6)

## 2025-07-01 PROCEDURE — 36415 COLL VENOUS BLD VENIPUNCTURE: CPT

## 2025-07-01 PROCEDURE — 83036 HEMOGLOBIN GLYCOSYLATED A1C: CPT

## 2025-07-10 DIAGNOSIS — E11.8 TYPE 2 DIABETES MELLITUS WITH COMPLICATION, WITHOUT LONG-TERM CURRENT USE OF INSULIN (H): ICD-10-CM

## 2025-07-11 NOTE — TELEPHONE ENCOUNTER
Please connect with loretta velazquez; insulin aspartate ( rapid acting)  How often is he using it? Need to clarify script for pharmacy.    [facility]

## 2025-07-11 NOTE — TELEPHONE ENCOUNTER
Call placed to Patient.  No answer.  Left message with call back number for Patient to return call.    Neri EASTON, Clinic RN    [facility]

## 2025-07-11 NOTE — TELEPHONE ENCOUNTER
Requested Prescriptions   Pending Prescriptions Disp Refills    insulin degludec (TRESIBA FLEXTOUCH) 100 UNIT/ML pen 15 mL 3     Sig: Inject 35 Units subcutaneously every morning.       Insulin Protocol Failed - 7/11/2025  7:52 AM        Failed - Chart review required     Review Chart.    Do not approve if insulin is used in a pump.  Instead, direct refill request to the patient's endocrinologist.  If the patient doesn't have an endocrinologist, then send the refill to the patient's PCP for review            Passed - HgbA1C in past 3 or 6 months     If HgbA1C is 8 or greater, it needs to be on file within the past 3 months.  If less than 8, must be on file within the past 6 months.     Recent Labs   Lab Test 07/01/25  1433   A1C 5.8*             Passed - Medication is active on med list and the sig matches. RN to manually verify dose and sig if red X/fail.     If the protocol passes (green check), you do not need to verify med dose and sig.    A prescription matches if they are the same clinical intention.    For Example: once daily and every morning are the same.    The protocol can not identify upper and lower case letters as matching and will fail.     For Example: Take 1 tablet (50 mg) by mouth daily     TAKE 1 TABLET (50 MG) BY MOUTH DAILY    For all fails (red x), verify dose and sig.    If the refill does match what is on file, the RN can still proceed to approve the refill request.       If they do not match, route to the appropriate provider.             Passed - Recent (6 month) or future (90 days) visit with the authorizing provider's specialty (provided they have been seen in the past 9 months)     The patient must have completed an in-person or virtual visit within the past 6 months or has a future visit scheduled within the next 90 days with the authorizing provider s specialty.  Urgent care and e-visits do not quality as an office visit for this protocol.          Passed - Medication indicated for  associated diagnosis     Medication is associated with one or more of the following diagnoses:   - Type 1 diabetes mellitus  - Type 2 diabetes mellitus  - Diabetic nephropathy; Prophylaxis  - Neuropathy due to diabetes mellitus; Prophylaxis  - Retinopathy due to diabetes mellitus; Prophylaxis  - Diabetes mellitus associated with cystic fibrosis  - Disorder of cardiovascular system; Prophylaxis - Type 1 diabetes mellitus   - Disorder of cardiovascular system; Prophylaxis - Type 2 diabetes mellitus            Passed - Patient is 18 years of age or older          Insulin Aspart FlexPen 100 UNIT/ML SOPN 15 mL      Sig: Inject 1-5 Units subcutaneously.       Insulin Protocol Failed - 7/11/2025  7:52 AM        Failed - Chart review required     Review Chart.    Do not approve if insulin is used in a pump.  Instead, direct refill request to the patient's endocrinologist.  If the patient doesn't have an endocrinologist, then send the refill to the patient's PCP for review            Passed - HgbA1C in past 3 or 6 months     If HgbA1C is 8 or greater, it needs to be on file within the past 3 months.  If less than 8, must be on file within the past 6 months.     Recent Labs   Lab Test 07/01/25  1433   A1C 5.8*             Passed - Medication is active on med list and the sig matches. RN to manually verify dose and sig if red X/fail.     If the protocol passes (green check), you do not need to verify med dose and sig.    A prescription matches if they are the same clinical intention.    For Example: once daily and every morning are the same.    The protocol can not identify upper and lower case letters as matching and will fail.     For Example: Take 1 tablet (50 mg) by mouth daily     TAKE 1 TABLET (50 MG) BY MOUTH DAILY    For all fails (red x), verify dose and sig.    If the refill does match what is on file, the RN can still proceed to approve the refill request.       If they do not match, route to the appropriate  provider.             Passed - Recent (6 month) or future (90 days) visit with the authorizing provider's specialty (provided they have been seen in the past 9 months)     The patient must have completed an in-person or virtual visit within the past 6 months or has a future visit scheduled within the next 90 days with the authorizing provider s specialty.  Urgent care and e-visits do not quality as an office visit for this protocol.          Passed - Medication indicated for associated diagnosis     Medication is associated with one or more of the following diagnoses:   - Type 1 diabetes mellitus  - Type 2 diabetes mellitus  - Diabetic nephropathy; Prophylaxis  - Neuropathy due to diabetes mellitus; Prophylaxis  - Retinopathy due to diabetes mellitus; Prophylaxis  - Diabetes mellitus associated with cystic fibrosis  - Disorder of cardiovascular system; Prophylaxis - Type 1 diabetes mellitus   - Disorder of cardiovascular system; Prophylaxis - Type 2 diabetes mellitus            Passed - Patient is 18 years of age or older

## 2025-07-14 RX ORDER — INSULIN ASPART 100 [IU]/ML
10 INJECTION, SOLUTION INTRAVENOUS; SUBCUTANEOUS 3 TIMES DAILY
Qty: 27 ML | Refills: 1 | Status: SHIPPED | OUTPATIENT
Start: 2025-07-14

## 2025-07-14 RX ORDER — INSULIN DEGLUDEC 100 U/ML
35 INJECTION, SOLUTION SUBCUTANEOUS EVERY MORNING
Qty: 15 ML | Refills: 3 | Status: SHIPPED | OUTPATIENT
Start: 2025-07-14

## 2025-07-14 NOTE — TELEPHONE ENCOUNTER
Call placed to patient   Relayed provider's message    Patient states he takes 10 units with meals (breakfast, lunch, and dinner)    Matthew Degroot, Clinic RN  [facility]

## 2025-09-04 DIAGNOSIS — I10 PRIMARY HYPERTENSION: ICD-10-CM

## 2025-09-04 RX ORDER — LISINOPRIL 20 MG/1
20 TABLET ORAL DAILY
Qty: 90 TABLET | Refills: 3 | OUTPATIENT
Start: 2025-09-04